# Patient Record
Sex: FEMALE | Race: WHITE | HISPANIC OR LATINO | Employment: FULL TIME | URBAN - METROPOLITAN AREA
[De-identification: names, ages, dates, MRNs, and addresses within clinical notes are randomized per-mention and may not be internally consistent; named-entity substitution may affect disease eponyms.]

---

## 2018-04-08 ENCOUNTER — HOSPITAL ENCOUNTER (EMERGENCY)
Facility: HOSPITAL | Age: 28
Discharge: HOME | End: 2018-04-08
Attending: EMERGENCY MEDICINE | Admitting: EMERGENCY MEDICINE
Payer: COMMERCIAL

## 2018-04-08 VITALS
RESPIRATION RATE: 18 BRPM | SYSTOLIC BLOOD PRESSURE: 118 MMHG | OXYGEN SATURATION: 100 % | DIASTOLIC BLOOD PRESSURE: 79 MMHG | TEMPERATURE: 98.4 F | HEART RATE: 80 BPM | WEIGHT: 188.49 LBS

## 2018-04-08 DIAGNOSIS — R42 DIZZINESS: ICD-10-CM

## 2018-04-08 DIAGNOSIS — R07.9 CHEST PAIN: Primary | ICD-10-CM

## 2018-04-08 LAB
ANION GAP SERPL CALCULATED.3IONS-SCNC: 11 MMOL/L (ref 4–13)
APTT PPP: 28 SECONDS (ref 23–35)
BASOPHILS # BLD AUTO: 0.03 THOUSANDS/ΜL (ref 0–0.1)
BASOPHILS NFR BLD AUTO: 1 % (ref 0–1)
BUN SERPL-MCNC: 13 MG/DL (ref 5–25)
CALCIUM SERPL-MCNC: 8.9 MG/DL (ref 8.3–10.1)
CHLORIDE SERPL-SCNC: 104 MMOL/L (ref 100–108)
CO2 SERPL-SCNC: 23 MMOL/L (ref 21–32)
CREAT SERPL-MCNC: 0.7 MG/DL (ref 0.6–1.3)
EOSINOPHIL # BLD AUTO: 0.13 THOUSAND/ΜL (ref 0–0.61)
EOSINOPHIL NFR BLD AUTO: 3 % (ref 0–6)
ERYTHROCYTE [DISTWIDTH] IN BLOOD BY AUTOMATED COUNT: 14.1 % (ref 11.6–15.1)
GFR SERPL CREATININE-BSD FRML MDRD: 119 ML/MIN/1.73SQ M
GLUCOSE SERPL-MCNC: 84 MG/DL (ref 65–140)
HCT VFR BLD AUTO: 38.2 % (ref 34.8–46.1)
HGB BLD-MCNC: 12.4 G/DL (ref 11.5–15.4)
INR PPP: 0.92 (ref 0.86–1.16)
LYMPHOCYTES # BLD AUTO: 1.34 THOUSANDS/ΜL (ref 0.6–4.47)
LYMPHOCYTES NFR BLD AUTO: 32 % (ref 14–44)
MCH RBC QN AUTO: 28.6 PG (ref 26.8–34.3)
MCHC RBC AUTO-ENTMCNC: 32.5 G/DL (ref 31.4–37.4)
MCV RBC AUTO: 88 FL (ref 82–98)
MONOCYTES # BLD AUTO: 0.37 THOUSAND/ΜL (ref 0.17–1.22)
MONOCYTES NFR BLD AUTO: 9 % (ref 4–12)
NEUTROPHILS # BLD AUTO: 2.33 THOUSANDS/ΜL (ref 1.85–7.62)
NEUTS SEG NFR BLD AUTO: 55 % (ref 43–75)
PLATELET # BLD AUTO: 336 THOUSANDS/UL (ref 149–390)
PMV BLD AUTO: 10.4 FL (ref 8.9–12.7)
POTASSIUM SERPL-SCNC: 3.5 MMOL/L (ref 3.5–5.3)
PROTHROMBIN TIME: 12.6 SECONDS (ref 12.1–14.4)
RBC # BLD AUTO: 4.34 MILLION/UL (ref 3.81–5.12)
SODIUM SERPL-SCNC: 138 MMOL/L (ref 136–145)
TROPONIN I SERPL-MCNC: <0.02 NG/ML
WBC # BLD AUTO: 4.2 THOUSAND/UL (ref 4.31–10.16)

## 2018-04-08 PROCEDURE — 93005 ELECTROCARDIOGRAM TRACING: CPT

## 2018-04-08 PROCEDURE — 84484 ASSAY OF TROPONIN QUANT: CPT | Performed by: EMERGENCY MEDICINE

## 2018-04-08 PROCEDURE — 85730 THROMBOPLASTIN TIME PARTIAL: CPT | Performed by: EMERGENCY MEDICINE

## 2018-04-08 PROCEDURE — 85610 PROTHROMBIN TIME: CPT | Performed by: EMERGENCY MEDICINE

## 2018-04-08 PROCEDURE — 96360 HYDRATION IV INFUSION INIT: CPT

## 2018-04-08 PROCEDURE — 36415 COLL VENOUS BLD VENIPUNCTURE: CPT | Performed by: EMERGENCY MEDICINE

## 2018-04-08 PROCEDURE — 85025 COMPLETE CBC W/AUTO DIFF WBC: CPT | Performed by: EMERGENCY MEDICINE

## 2018-04-08 PROCEDURE — 80048 BASIC METABOLIC PNL TOTAL CA: CPT | Performed by: EMERGENCY MEDICINE

## 2018-04-08 PROCEDURE — 99284 EMERGENCY DEPT VISIT MOD MDM: CPT

## 2018-04-08 RX ORDER — MECLIZINE HCL 12.5 MG/1
25 TABLET ORAL ONCE
Status: COMPLETED | OUTPATIENT
Start: 2018-04-08 | End: 2018-04-08

## 2018-04-08 RX ORDER — MECLIZINE HYDROCHLORIDE 25 MG/1
25 TABLET ORAL 3 TIMES DAILY PRN
Qty: 30 TABLET | Refills: 0 | Status: SHIPPED | OUTPATIENT
Start: 2018-04-08

## 2018-04-08 RX ADMIN — SODIUM CHLORIDE 1000 ML: 0.9 INJECTION, SOLUTION INTRAVENOUS at 09:42

## 2018-04-08 RX ADMIN — MECLIZINE 25 MG: 12.5 TABLET ORAL at 09:54

## 2018-04-08 NOTE — DISCHARGE INSTRUCTIONS
Mareos, cuidados ambulatorios   INFORMACIÓN GENERAL:   El mareo  es un término que se [place] para describir shine sensación de desequilibrio o inestabilidad  Las causas comunes del mareo son un desequilibrio del líquido del oído interno o la falta de oxígeno en chiu maile  Chiu mareo puede ser kyrie (dura 3 días o menos) o crónico (dura más de 3 días)  Usted puede llegar a tener episodios de [employer] que myers de segundos a unas horas  Síntomas comunes relacionados a los mareos [facility] siguientes:   · Shine sensación de que los alrededores se están moviendo aún cuando usted está quieto     · Tintineo en los oídos o pérdida del oído     · Sensación de mareo o desvanecimiento    · Debilidad o inestabilidad     · Visión doble o movimientos oculares que usted no puede controlar     · Náusea o vómito     · Confusión  Busque cuidados inmediatos para los siguientes síntomas:   · Usted tiene dolor de [place] y el dolor no se le lev con medicamento  · Usted tiene escalofríos cee y [place]  · Usted vomita shine y [place] vez sin [place]  · Chiu vómito o heces están rojos o negros  · Usted tiene dolor en el pecho, espalda o abdomen  · Usted tiene adormecimiento, sobre todo en la sean, brazos, o piernas  · Usted tiene dificultad para  los brazos o piernas  El tratamiento para los mareos  depende de la causa de wali [place]  Es probable que usted necesite medicamentos para disminuir wali mareos o náusea  También es probable que usted necesite que lo internen en el hospital para recibir tratamiento  Maneje wali síntomas:  Levántese despacio después de ruth estado sentado o acostado  No maneje ni opere maquinaria cuando esté mareado  Programe shine gracia de seguimiento con chiu proveedor de rosa isela según indicaciones:  Anote wali preguntas para que las recuerde kennedy wali citas de seguimiento  ACUERDOS SOBRE CHIU CUIDADO:   Usted tiene el derecho de participar en la planificación de chiu cuidado   Aprenda todo lo que pueda sobre chiu condición y alphonse darle [place]  Discuta con wali médicos wali opciones de tratamiento para juntos decidir el cuidado que usted quiere recibir  Usted siempre tiene el derecho a rechazar chiu tratamiento  Esta información es sólo para uso en educación  Chiu intención no es darle un consejo médico sobre enfermedades o tratamientos  Colsulte con chiu Bary Alonso farmacéutico antes de seguir cualquier régimen médico para saber si es seguro y efectivo para usted  © 2014 [address] is for End User's use only and may not be sold, redistributed or otherwise used for commercial purposes  All illustrations and images included in CareNotes® are the copyrighted property of A D A M , Inc  or Kishor Elena  Dolor de pecho   LO QUE NECESITA SABER:   ¿Qué provoca el dolor en el pecho? El dolor en el pecho puede ser provocado por shine variedad de condiciones, algunas no tan serias y otras que son de peligro mortal  El dolor de pecho también puede ser un síntoma de un problema digestivo, alphonse la acidez o shine úlcera estomacal  Un ataque de ansiedad o shine emoción yeimi, alphonse el enojo, también pueden provocar dolor de [place]  Shine infección, inflamación o fractura en un hueso o cartílago en el pecho podría provocar dolor o molestia  En ocasiones el dolor torácico o la presión en el pecho pueden ser el resultado de edda circulación de la maile al corazón (angina)  El dolor de pecho también puede ser causado por trastornos potencialmente mortales alphonse un ataque al corazón o un coágulo de [employer]  ¿Cuáles otros síntomas podrían presentarse con el dolor en el pecho?    · Shine sensación de ardor detrás del esternón    · Ritmo cardíaco acelerado o lento     · Fiebre o sudoración     · Náuseas o vómito     · Dificultad para respirar     · [employer] o presión que se propaga del pecho a la espalda, Tran o brazo     · Sensación de debilidad, cansancio o desmayo  ¿Cómo se diagnostica la causa del dolor en el pecho? Chiu médico lo examinará  Describa chiu dolor en el pecho con el celia detalle que sea posible  Dígale dónde le duele y cuándo empezó el dolor  Coméntele si usted nota algo que hace empeorar o mejorar el dolor  Además infórmele si el dolor es carli o intermitente  Chiu médico le preguntará cuáles son los [place] anika y acerca de cualquier condición médica que tenga  También lo examinará  Es posible que también deba hacerse alguno de los siguientes exámenes:  · Un electrocardiograma  es un examen que registra la actividad eléctrica de chiu corazón  · Los análisis de maile:  revisan si hay daños en el corazón y signos de ataque cardíaco      · Un ecocardiograma  [place] ondas de laz para eloisa si la maile fluye normalmente a través del corazón  · Un ultrasonido, shine radiografía, shine tomografía computarizada o shine imagen por resonancia magnética (IRM)  podría mostrar la causa de chiu dolor de pecho  Es posible que le administren líquido de contraste para que chiu corazón se clement mejor en las imágenes  Dígale al médico si usted alguna vez ha tenido shine reacción alérgica al líquido de [place]  No entre a la eliezer donde se realiza la resonancia magnética con algo de metal  El metal puede causar lesiones serias  Dígale al médico si usted tiene algo de metal por dentro o sobre chiu cuerpo  · Shine endoscopia  puede hacerse para revisar si tiene úlceras o problemas con el esófago  ¿Cómo se trata el dolor en el pecho? Se le podrán administrar medicamentos para tratar la causa del dolor de pecho  Por ejemplo, analgésicos, medicamentos para la ansiedad o medicamentos para aumentar el flujo de maile al corazón  No  tome ciertos medicamentos sin antes preguntarle a chiu médico  Estos incluyen TASNEEM, suplementos vitamínicos o a base de hierbas u hormonas (estrógeno o progestágeno)  ¿Cuáles son Sarah Oseguera consejos para vivir shine kyle bonnie?   Los siguientes son consejos generales de rosa isela  Si chiu dolor de pecho es causado por un problema cardíaco, chiu médico le dará consejos específicos a seguir  · No fume  La nicotina y otros químicos contenidos en los cigarrillos y cigarros pueden causar daño a bertha pulmones y el corazón  Pida información a chiu médico si usted actualmente fuma y necesita ayuda para dejar de fumar  Los cigarrillos electrónicos o tabaco sin humo todavía contienen nicotina  Consulte con chiu médico antes de [employer]  · Consuma shine variedad de alimentos saludables y bajos en grasas  Los alimentos saludables incluyen frutas, verduras, pan integral, productos lácteos bajos en grasa, frijoles, maik magras y pescado  Pida más información acerca de shine dieta saludable para el corazón  · Pregunte acerca de la [place]  Chiu médico le dirá cuáles actividades limitar y cuáles evitar  Pregunte cuándo [employer], regresar a chiu trabajo y [employer]  Pida más información acerca de un plan de ejercicio adecuado para usted  · Mantenga un peso saludable  Consulte con chiu médico cuánto debería pesar  Solicite que lo ayude a crear un plan para bajar de peso si tiene sobrepeso  Llame al 911 si presenta:   · Usted tiene alguno de los siguientes signos de un ataque cardíaco:      ¨ Estornudos, presión, o dolor en chiu pecho que dura mas de 5 minutos o regresa  ¨ Malestar o dolor en chiu espalda, anant, mandíbula, abdomen, o brazo     ¨ Dificultad para respirar    ¨ Náuseas o vómito    ¨ Siente un desvanecimiento o tiene sudores fríos especialmente en el pecho o dificultad para respirar  ¿Cuándo beltran buscar atención inmediata? · La inflamación en chiu pecho empeora, aun con tratamiento  · Usted tose o vomita maile  · Bertha heces son negras o tienen maile  · Usted no puede dejar de vomitar o le duele al tragar    ¿Cuándo beltran comunicarme con mi médico?   · Usted tiene preguntas o inquietudes acerca de chiu condición o cuidado  ACUERDOS SOBRE GREEN CUIDADO:   Usted tiene el derecho de ayudar a planear green cuidado  Aprenda todo lo que pueda sobre green condición y alphonse darle tratamiento  Discuta wali opciones de tratamiento con wali médicos para decidir el cuidado que usted desea recibir  Usted siempre tiene el derecho de rechazar el tratamiento  Esta información es sólo para uso en educación  Green intención no es darle un consejo médico sobre enfermedades o tratamientos  Colsulte con green Suzi Burris farmacéutico antes de seguir cualquier régimen médico para saber si es seguro y efectivo para usted  © 2017 [address] Information is for End User's use only and may not be sold, redistributed or otherwise used for commercial purposes  All illustrations and images included in CareNotes® are the copyrighted property of A D A M , Inc  or Kishor Elena

## 2018-04-08 NOTE — ED PROVIDER NOTES
History  Chief Complaint   Patient presents with    Dizziness     pt was working at [employer] when she got a sharp sudden pain in chest, became dizzy  felt tlike she couldnt stand, not feels like her legs feels numb and hands cant open all the way and feels short of breath     Patient presents to the emergency department for evaluation of chest pain dizziness sob and bilateral hand and extremity numbness and tingling  This occurred while patient was at work  She denies any trauma fall or injury  She feels improved currently  She denies medical history and is not on medication  She denies trauma fall or injury  Denies fever chills  Denies visual complaints  Denies head injury or neck pain  Denies rash  Patient denies nausea vomiting diarrhea  Denies ill contacts  Denies leg pain or calf pain  None       History reviewed  No pertinent past medical history  History reviewed  No pertinent surgical history  History reviewed  No pertinent family history  I have reviewed and agree with the history as documented  Social History   Substance Use Topics    Smoking status: Never Smoker    Smokeless tobacco: Never Used    Alcohol use No        Review of Systems   Constitutional: Negative  Negative for activity change, appetite change, chills, diaphoresis, fatigue and fever  HENT: Negative  Negative for congestion, drooling, rhinorrhea, sinus pain, sinus pressure, sore throat and trouble swallowing  Eyes: Negative  Negative for photophobia and visual disturbance  Respiratory: Positive for shortness of breath  Negative for cough, chest tightness, wheezing and stridor  Cardiovascular: Positive for chest pain  Negative for palpitations and leg swelling  Gastrointestinal: Negative  Negative for abdominal distention, abdominal pain, anal bleeding, blood in stool, constipation, diarrhea, nausea, rectal pain and vomiting  Endocrine: Negative  Genitourinary: Negative    Negative for difficulty urinating, dysuria, flank pain, frequency, genital sores, hematuria, pelvic pain, urgency, vaginal bleeding, vaginal discharge and vaginal pain  Musculoskeletal: Negative  Negative for back pain and neck pain  Skin: Negative  Negative for rash and wound  Allergic/Immunologic: Negative  Neurological: Positive for dizziness, light-headedness and numbness  Negative for tremors, seizures, syncope, facial asymmetry, speech difficulty, weakness and headaches  Hematological: Negative  Psychiatric/Behavioral: Negative  Physical Exam  ED Triage Vitals [04/08/18 0848]   Temperature Pulse Respirations Blood Pressure SpO2   98 4 °F (36 9 °C) 76 18 122/84 98 %      Temp Source Heart Rate Source Patient Position - Orthostatic VS BP Location FiO2 (%)   Oral Monitor Sitting Right arm --      Pain Score       Worst Possible Pain           Orthostatic Vital Signs  Vitals:    04/08/18 0848 04/08/18 0900   BP: 122/84 118/79   Pulse: 76 80   Patient Position - Orthostatic VS: Sitting        Physical Exam   Constitutional: She is oriented to person, place, and time  She appears well-developed and well-nourished  Nontoxic appearance without respiratory distress  Patient makes poor eye contact and her eyes were closed during most of the history taking session  Patient does not appear to be in respiratory distress and does not appear to be uncomfortable sitting upright on the stretcher  HENT:   Head: Normocephalic and atraumatic  Right Ear: External ear normal    Left Ear: External ear normal    Mouth/Throat: Oropharynx is clear and moist    Eyes: Conjunctivae and EOM are normal  Pupils are equal, round, and reactive to light  Neck: Normal range of motion  Neck supple  Cardiovascular: Normal rate, regular rhythm, normal heart sounds and intact distal pulses  Pulmonary/Chest: Effort normal and breath sounds normal  No respiratory distress  She has no wheezes  She has no rales   She exhibits no tenderness  Abdominal: Soft  Bowel sounds are normal  She exhibits no distension and no mass  There is no tenderness  There is no rebound and no guarding  No hernia  Musculoskeletal: Normal range of motion  She exhibits no edema, tenderness or deformity  Neurological: She is alert and oriented to person, place, and time  She has normal reflexes  She displays normal reflexes  No cranial nerve deficit or sensory deficit  She exhibits normal muscle tone  Coordination normal    Skin: Skin is warm and dry  No rash noted  No erythema  No pallor  Psychiatric: She has a normal mood and affect  Her behavior is normal  Judgment normal    Nursing note and vitals reviewed  ED Medications  Medications   sodium chloride 0 9 % bolus 1,000 mL (1,000 mL Intravenous New Bag 4/8/18 0942)   meclizine (ANTIVERT) tablet 25 mg (25 mg Oral Given 4/8/18 0954)       Diagnostic Studies  Results Reviewed     Procedure Component Value Units Date/Time    Troponin I [[id number]]  (Normal) Collected:  04/08/18 0946    Lab Status:  Final result Specimen:  Blood from Arm, Right Updated:  04/08/18 1012     Troponin I <0 02 ng/mL     Narrative:         [employer] Chemistry analyzer 99% cutoff is > 0 04 ng/mL in network labs    o cTnI 99% cutoff is useful only when applied to patients in the clinical setting of myocardial ischemia  o cTnI 99% cutoff should be interpreted in the context of clinical history, ECG findings and possibly cardiac imaging to establish correct diagnosis  o cTnI 99% cutoff may be suggestive but clearly not indicative of a coronary event without the clinical setting of myocardial ischemia      Basic metabolic panel [[id number]] Collected:  04/08/18 0946    Lab Status:  Final result Specimen:  Blood from Arm, Right Updated:  04/08/18 1004     Sodium 138 mmol/L      Potassium 3 5 mmol/L      Chloride 104 mmol/L      CO2 23 mmol/L      Anion Gap 11 mmol/L      BUN 13 mg/dL      Creatinine 0 70 mg/dL      Glucose 84 mg/dL Calcium 8 9 mg/dL      eGFR 119 ml/min/1 73sq m     Narrative:         National Kidney Disease Education Program recommendations are as follows:  GFR calculation is accurate only with a steady state creatinine  Chronic Kidney disease less than 60 ml/min/1 73 sq  meters  Kidney failure less than 15 ml/min/1 73 sq  meters  Moo Hernandez [[id number]]  (Normal) Collected:  04/08/18 0946    Lab Status:  Final result Specimen:  Blood from Arm, Right Updated:  04/08/18 1004     Protime 12 6 seconds      INR 0 92    APTT [[id number]]  (Normal) Collected:  04/08/18 0946    Lab Status:  Final result Specimen:  Blood from Arm, Right Updated:  04/08/18 1004     PTT 28 seconds     Narrative:          Therapeutic Heparin Range = 60-90 seconds    CBC and differential [[id number]]  (Abnormal) Collected:  04/08/18 0946    Lab Status:  Final result Specimen:  Blood from Arm, Right Updated:  04/08/18 0953     WBC 4 20 (L) Thousand/uL      RBC 4 34 Million/uL      Hemoglobin 12 4 g/dL      Hematocrit 38 2 %      MCV 88 fL      MCH 28 6 pg      MCHC 32 5 g/dL      RDW 14 1 %      MPV 10 4 fL      Platelets 731 Thousands/uL      Neutrophils Relative 55 %      Lymphocytes Relative 32 %      Monocytes Relative 9 %      Eosinophils Relative 3 %      Basophils Relative 1 %      Neutrophils Absolute 2 33 Thousands/µL      Lymphocytes Absolute 1 34 Thousands/µL      Monocytes Absolute 0 37 Thousand/µL      Eosinophils Absolute 0 13 Thousand/µL      Basophils Absolute 0 03 Thousands/µL                  No orders to display              Procedures  ECG 12 Lead Documentation  Date/Time: 4/8/2018 9:24 AM  Performed by: Radha Rivera  Authorized by: Radha Rivera     ECG reviewed by me, the ED Provider: yes    Patient location:  ED  Previous ECG:     Previous ECG:  Unavailable  Interpretation:     Interpretation: normal    Rate:     ECG rate:  74    ECG rate assessment: normal    Rhythm:     Rhythm: sinus rhythm    Ectopy:     Ectopy: none    QRS: QRS axis:  Normal    QRS intervals:  Normal  Conduction:     Conduction: normal    ST segments:     ST segments:  Normal  T waves:     T waves: normal             Phone Contacts  ED Phone Contact    ED Course  ED Course as of Apr 08 1031   Sun Apr 08, 2018   1016 Patient is stable for discharge  Discharge with meclizine  ED workup unremarkable including troponin other labs and EKG  Vital signs stable  [facility]  CritCare Time    Disposition  Final diagnoses:   Chest pain   Dizziness     Time reflects when diagnosis was documented in both MDM as applicable and the Disposition within this note     Time User Action Codes Description Comment    4/8/2018 10:17 AM Ankita Norris Add [R07 9] Chest pain     4/8/2018 10:17 AM Margie Dickinson Add [R42] Dizziness       ED Disposition     ED Disposition Condition Comment    Discharge  [facility] discharge to home/self care  Condition at discharge: Stable        Follow-up Information     Follow up With Specialties Details Why [address] [place] [address] physician  Schedule an appointment as soon as possible for a visit          Patient's Medications   Discharge Prescriptions    MECLIZINE (ANTIVERT) 25 MG TABLET    Take 1 tablet (25 mg total) by mouth 3 (three) times a day as needed for dizziness       Start Date: 4/8/2018  End Date: --       Order Dose: 25 mg       Quantity: 30 tablet    Refills: 0     No discharge procedures on file      ED Provider  Electronically Signed by           Portillo Mack MD  04/08/18 372 Eusebio Harrison MD  04/08/18 0126

## 2018-04-09 LAB
ATRIAL RATE: 74 BPM
P AXIS: 39 DEGREES
PR INTERVAL: 144 MS
QRS AXIS: 59 DEGREES
QRSD INTERVAL: 82 MS
QT INTERVAL: 362 MS
QTC INTERVAL: 401 MS
T WAVE AXIS: 41 DEGREES
VENTRICULAR RATE: 74 BPM

## 2022-02-08 ENCOUNTER — APPOINTMENT (EMERGENCY)
Dept: CT IMAGING | Facility: HOSPITAL | Age: 32
End: 2022-02-08
Payer: COMMERCIAL

## 2022-02-08 ENCOUNTER — HOSPITAL ENCOUNTER (EMERGENCY)
Facility: HOSPITAL | Age: 32
Discharge: HOME | End: 2022-02-08
Attending: EMERGENCY MEDICINE | Admitting: EMERGENCY MEDICINE
Payer: COMMERCIAL

## 2022-02-08 ENCOUNTER — APPOINTMENT (EMERGENCY)
Dept: RADIOLOGY | Facility: HOSPITAL | Age: 32
End: 2022-02-08
Payer: COMMERCIAL

## 2022-02-08 VITALS
OXYGEN SATURATION: 100 % | SYSTOLIC BLOOD PRESSURE: 120 MMHG | WEIGHT: 177.69 LBS | HEIGHT: 67 IN | HEART RATE: 66 BPM | TEMPERATURE: 98 F | RESPIRATION RATE: 20 BRPM | BODY MASS INDEX: 27.89 KG/M2 | DIASTOLIC BLOOD PRESSURE: 80 MMHG

## 2022-02-08 DIAGNOSIS — R07.89 ATYPICAL CHEST PAIN: Primary | ICD-10-CM

## 2022-02-08 DIAGNOSIS — K29.00 ACUTE GASTRITIS WITHOUT HEMORRHAGE, UNSPECIFIED GASTRITIS TYPE: ICD-10-CM

## 2022-02-08 LAB
ALBUMIN SERPL BCP-MCNC: 3.7 G/DL (ref 3.5–5)
ALP SERPL-CCNC: 68 U/L (ref 46–116)
ALT SERPL W P-5'-P-CCNC: 25 U/L (ref 12–78)
ANION GAP SERPL CALCULATED.3IONS-SCNC: 5 MMOL/L (ref 4–13)
AST SERPL W P-5'-P-CCNC: 13 U/L (ref 5–45)
ATRIAL RATE: 66 BPM
BASOPHILS # BLD AUTO: 0.02 THOUSANDS/ΜL (ref 0–0.1)
BASOPHILS NFR BLD AUTO: 0 % (ref 0–1)
BILIRUB SERPL-MCNC: 0.31 MG/DL (ref 0.2–1)
BILIRUB UR QL STRIP: NEGATIVE
BUN SERPL-MCNC: 8 MG/DL (ref 5–25)
CALCIUM SERPL-MCNC: 8.6 MG/DL (ref 8.3–10.1)
CARDIAC TROPONIN I PNL SERPL HS: <2 NG/L
CARDIAC TROPONIN I PNL SERPL HS: <2 NG/L
CHLORIDE SERPL-SCNC: 102 MMOL/L (ref 100–108)
CLARITY UR: CLEAR
CO2 SERPL-SCNC: 27 MMOL/L (ref 21–32)
COLOR UR: YELLOW
CREAT SERPL-MCNC: 0.68 MG/DL (ref 0.6–1.3)
EOSINOPHIL # BLD AUTO: 0.11 THOUSAND/ΜL (ref 0–0.61)
EOSINOPHIL NFR BLD AUTO: 2 % (ref 0–6)
ERYTHROCYTE [DISTWIDTH] IN BLOOD BY AUTOMATED COUNT: 12.6 % (ref 11.6–15.1)
EXT PREG TEST URINE: NEGATIVE
EXT. CONTROL ED NAV: NORMAL
GFR SERPL CREATININE-BSD FRML MDRD: 116 ML/MIN/1.73SQ M
GLUCOSE SERPL-MCNC: 102 MG/DL (ref 65–140)
GLUCOSE UR STRIP-MCNC: NEGATIVE MG/DL
HCT VFR BLD AUTO: 41.2 % (ref 34.8–46.1)
HGB BLD-MCNC: 13.6 G/DL (ref 11.5–15.4)
HGB UR QL STRIP.AUTO: NEGATIVE
IMM GRANULOCYTES # BLD AUTO: 0.02 THOUSAND/UL (ref 0–0.2)
IMM GRANULOCYTES NFR BLD AUTO: 0 % (ref 0–2)
KETONES UR STRIP-MCNC: NEGATIVE MG/DL
LEUKOCYTE ESTERASE UR QL STRIP: NEGATIVE
LIPASE SERPL-CCNC: 34 U/L (ref 73–393)
LYMPHOCYTES # BLD AUTO: 1.1 THOUSANDS/ΜL (ref 0.6–4.47)
LYMPHOCYTES NFR BLD AUTO: 24 % (ref 14–44)
MCH RBC QN AUTO: 30.1 PG (ref 26.8–34.3)
MCHC RBC AUTO-ENTMCNC: 33 G/DL (ref 31.4–37.4)
MCV RBC AUTO: 91 FL (ref 82–98)
MONOCYTES # BLD AUTO: 0.51 THOUSAND/ΜL (ref 0.17–1.22)
MONOCYTES NFR BLD AUTO: 11 % (ref 4–12)
NEUTROPHILS # BLD AUTO: 2.76 THOUSANDS/ΜL (ref 1.85–7.62)
NEUTS SEG NFR BLD AUTO: 63 % (ref 43–75)
NITRITE UR QL STRIP: NEGATIVE
NRBC BLD AUTO-RTO: 0 /100 WBCS
P AXIS: 60 DEGREES
PH UR STRIP.AUTO: 6 [PH] (ref 4.5–8)
PLATELET # BLD AUTO: 327 THOUSANDS/UL (ref 149–390)
PMV BLD AUTO: 10.4 FL (ref 8.9–12.7)
POTASSIUM SERPL-SCNC: 3.7 MMOL/L (ref 3.5–5.3)
PR INTERVAL: 140 MS
PROT SERPL-MCNC: 7.6 G/DL (ref 6.4–8.2)
PROT UR STRIP-MCNC: NEGATIVE MG/DL
QRS AXIS: 91 DEGREES
QRSD INTERVAL: 76 MS
QT INTERVAL: 368 MS
QTC INTERVAL: 385 MS
RBC # BLD AUTO: 4.52 MILLION/UL (ref 3.81–5.12)
SODIUM SERPL-SCNC: 134 MMOL/L (ref 136–145)
SP GR UR STRIP.AUTO: 1.02 (ref 1–1.03)
T WAVE AXIS: 61 DEGREES
UROBILINOGEN UR QL STRIP.AUTO: 0.2 E.U./DL
VENTRICULAR RATE: 66 BPM
WBC # BLD AUTO: 4.52 THOUSAND/UL (ref 4.31–10.16)

## 2022-02-08 PROCEDURE — 99285 EMERGENCY DEPT VISIT HI MDM: CPT

## 2022-02-08 PROCEDURE — 80053 COMPREHEN METABOLIC PANEL: CPT | Performed by: EMERGENCY MEDICINE

## 2022-02-08 PROCEDURE — 71045 X-RAY EXAM CHEST 1 VIEW: CPT

## 2022-02-08 PROCEDURE — G1004 CDSM NDSC: HCPCS

## 2022-02-08 PROCEDURE — 93010 ELECTROCARDIOGRAM REPORT: CPT | Performed by: INTERNAL MEDICINE

## 2022-02-08 PROCEDURE — 36415 COLL VENOUS BLD VENIPUNCTURE: CPT

## 2022-02-08 PROCEDURE — 83690 ASSAY OF LIPASE: CPT

## 2022-02-08 PROCEDURE — 81003 URINALYSIS AUTO W/O SCOPE: CPT

## 2022-02-08 PROCEDURE — 81025 URINE PREGNANCY TEST: CPT

## 2022-02-08 PROCEDURE — 93005 ELECTROCARDIOGRAM TRACING: CPT

## 2022-02-08 PROCEDURE — 85025 COMPLETE CBC W/AUTO DIFF WBC: CPT | Performed by: EMERGENCY MEDICINE

## 2022-02-08 PROCEDURE — 84484 ASSAY OF TROPONIN QUANT: CPT | Performed by: EMERGENCY MEDICINE

## 2022-02-08 PROCEDURE — 99285 EMERGENCY DEPT VISIT HI MDM: CPT | Performed by: EMERGENCY MEDICINE

## 2022-02-08 PROCEDURE — 74177 CT ABD & PELVIS W/CONTRAST: CPT

## 2022-02-08 RX ORDER — MAGNESIUM HYDROXIDE/ALUMINUM HYDROXICE/SIMETHICONE 120; 1200; 1200 MG/30ML; MG/30ML; MG/30ML
30 SUSPENSION ORAL ONCE
Status: COMPLETED | OUTPATIENT
Start: 2022-02-08 | End: 2022-02-08

## 2022-02-08 RX ORDER — LIDOCAINE HYDROCHLORIDE 20 MG/ML
15 SOLUTION OROPHARYNGEAL ONCE
Status: COMPLETED | OUTPATIENT
Start: 2022-02-08 | End: 2022-02-08

## 2022-02-08 RX ORDER — FAMOTIDINE 20 MG/1
20 TABLET, FILM COATED ORAL ONCE
Status: COMPLETED | OUTPATIENT
Start: 2022-02-08 | End: 2022-02-08

## 2022-02-08 RX ADMIN — FAMOTIDINE 20 MG: 20 TABLET, FILM COATED ORAL at 08:27

## 2022-02-08 RX ADMIN — LIDOCAINE HYDROCHLORIDE 15 ML: 20 SOLUTION ORAL; TOPICAL at 08:27

## 2022-02-08 RX ADMIN — ALUMINA, MAGNESIA, AND SIMETHICONE ORAL SUSPENSION REGULAR STRENGTH 30 ML: 1200; 1200; 120 SUSPENSION ORAL at 09:08

## 2022-02-08 RX ADMIN — IODIXANOL 100 ML: 320 INJECTION, SOLUTION INTRAVASCULAR at 08:44

## 2022-02-08 NOTE — ED PROVIDER NOTES
History  Chief Complaint   Patient presents with    Chest Pain     Patient stated that she woke up with chest pain that radiates into her jaw and left arm  [facility] after experiencing sudden onset of chest pain and left arm pain that began the morning of February 8th with no inciting event  She states that she woke up from bed and felt this chest discomfort and left arm pain, expressed feeling nauseous and attempted to vomit 1 time this morning that was only liquid/bilious/food contents  Describes that she has been having ongoing nausea/abdominal discomfort for the past 3 days  She has not taken any medications for these symptoms  She also describes having transitory joint pain following episodes of emesis had resolved after approximately 15-20 minutes  She describes that she has no medical conditions that she is followed by medical professionals for  Denies any dizziness, lightheadedness, changes in vision, changes in hearing, difficulty catching her breath, skin changes, loss of consciousness, fever, chills, changes in urination, changes in bowel movements  She states that she did not lose consciousness today and did not strike her head  She denies any prodromal symptoms of feeling dizzy, lightheaded, or increased diaphoresis  At time of interview in the emergency department, patient stated that she was still having persistent left chest wall pain and left arm pain  However, the severity of the symptoms have dramatically decreased since the onset this morning approximately 3 hours prior        History provided by:  Patient   used: No    Chest Pain  Pain location:  L chest  Pain quality: stabbing and throbbing    Pain radiates to:  L arm  Pain radiates to the back: no    Pain severity:  Mild  Onset quality:  Sudden  Duration:  4 hours  Timing:  Constant  Progression:  Improving  Chronicity:  New  Context: not breathing, no drug use, not eating, no intercourse, not lifting, no movement, not raising an arm, not at rest, no stress and no trauma    Relieved by:  Nothing  Worsened by:  Nothing tried  Ineffective treatments:  None tried  Associated symptoms: abdominal pain    Associated symptoms: no AICD problem, no altered mental status, no anorexia, no anxiety, no back pain, no claudication, no cough, no diaphoresis, no dizziness, no dysphagia, no fatigue, no fever, no headache, no heartburn, no lower extremity edema, no nausea, no near-syncope, no numbness, no orthopnea, no palpitations, no PND, no shortness of breath, no syncope, not vomiting and no weakness        Prior to Admission Medications   Prescriptions Last Dose Informant Patient Reported? Taking?   meclizine (ANTIVERT) 25 mg tablet   No No   Sig: Take 1 tablet (25 mg total) by mouth 3 (three) times a day as needed for dizziness      Facility-Administered Medications: None       History reviewed  No pertinent past medical history  History reviewed  No pertinent surgical history  History reviewed  No pertinent family history  I have reviewed and agree with the history as documented  E-Cigarette/Vaping     E-Cigarette/Vaping Substances     Social History     Tobacco Use    Smoking status: Never Smoker    Smokeless tobacco: Never Used   Substance Use Topics    Alcohol use: No    Drug use: No        Review of Systems   Constitutional: Negative for chills, diaphoresis, fatigue and fever  HENT: Negative for ear pain, sore throat and trouble swallowing  Eyes: Negative for pain and visual disturbance  Respiratory: Negative for cough and shortness of breath  Cardiovascular: Positive for chest pain  Negative for palpitations, orthopnea, claudication, syncope, PND and near-syncope  Gastrointestinal: Positive for abdominal pain  Negative for anorexia, heartburn, nausea and vomiting  Genitourinary: Negative for dysuria and hematuria     Musculoskeletal: Negative for arthralgias and back pain    Skin: Negative for color change and rash  Neurological: Negative for dizziness, seizures, syncope, weakness, numbness and headaches  All other systems reviewed and are negative  Physical Exam  ED Triage Vitals [02/08/22 0519]   Temperature Pulse Respirations Blood Pressure SpO2   98 °F (36 7 °C) 67 16 122/76 100 %      Temp Source Heart Rate Source Patient Position - Orthostatic VS BP Location FiO2 (%)   Oral Monitor Lying Right arm --      Pain Score       8             Orthostatic Vital Signs  Vitals:    02/08/22 0519 02/08/22 0900   BP: 122/76 120/80   Pulse: 67 66   Patient Position - Orthostatic VS: Lying        Physical Exam  Vitals and nursing note reviewed  Constitutional:       Appearance: She is well-developed  HENT:      Head: Normocephalic and atraumatic  Eyes:      Extraocular Movements: Extraocular movements intact  Conjunctiva/sclera: Conjunctivae normal       Pupils: Pupils are equal, round, and reactive to light  Cardiovascular:      Rate and Rhythm: Normal rate and regular rhythm  Heart sounds: Normal heart sounds  No murmur heard  No S3 or S4 sounds  Pulmonary:      Effort: Pulmonary effort is normal  No respiratory distress  Breath sounds: Normal breath sounds  Abdominal:      Palpations: Abdomen is soft  Tenderness: There is abdominal tenderness  There is no guarding or rebound  Comments: Tenderness present of right lower, suprapubic, and left lower quadrant  Musculoskeletal:      Cervical back: Normal range of motion and neck supple  Skin:     General: Skin is warm and dry  Capillary Refill: Capillary refill takes less than 2 seconds  Neurological:      General: No focal deficit present  Mental Status: She is alert and oriented to person, place, and time     Psychiatric:         Mood and Affect: Mood normal          ED Medications  Medications   Lidocaine Viscous HCl (XYLOCAINE) 2 % mucosal solution 15 mL (15 mL Swish & Swallow Given 2/8/22 0827)   famotidine (PEPCID) tablet 20 mg (20 mg Oral Given 2/8/22 0827)   aluminum-magnesium hydroxide-simethicone (MYLANTA) oral suspension 30 mL (30 mL Oral Given 2/8/22 0908)   iodixanol (VISIPAQUE) 320 MG/ML injection 100 mL (100 mL Intravenous Given 2/8/22 0844)       Diagnostic Studies  Results Reviewed     Procedure Component Value Units Date/Time    HS Troponin I 2hr [[id number]] Collected: 02/08/22 0810    Lab Status: Final result Specimen: Blood from Arm, Left Updated: 02/08/22 0844     hs TnI 2hr <2 ng/L      Delta 2hr hsTnI --    Lipase [[id number]]  (Abnormal) Collected: 02/08/22 0531    Lab Status: Final result Specimen: Blood from Arm, Left Updated: 02/08/22 0805     Lipase 34 u/L     POCT pregnancy, urine [[id number]]  (Normal) Resulted: 02/08/22 0800    Lab Status: Final result Updated: 02/08/22 0800     EXT PREG TEST UR (Ref: Negative) negative     Control valid    Urine Macroscopic, POC [[id number]] Collected: 02/08/22 0755    Lab Status: Final result Specimen: Urine Updated: 02/08/22 0757     Color, UA Yellow     Clarity, UA Clear     pH, UA 6 0     Leukocytes, UA Negative     Nitrite, UA Negative     Protein, UA Negative mg/dl      Glucose, UA Negative mg/dl      Ketones, UA Negative mg/dl      Urobilinogen, UA 0 2 E U /dl      Bilirubin, UA Negative     Blood, UA Negative     Specific Gravity, UA 1 020    Narrative:      CLINITEK RESULT    HS Troponin 0hr (reflex protocol) [[id number]]  (Normal) Collected: 02/08/22 0531    Lab Status: Final result Specimen: Blood from Arm, Left Updated: 02/08/22 0609     hs TnI 0hr <2 ng/L     Comprehensive metabolic panel [[id number]]  (Abnormal) Collected: 02/08/22 0531    Lab Status: Final result Specimen: Blood from Arm, Left Updated: 02/08/22 0556     Sodium 134 mmol/L      Potassium 3 7 mmol/L      Chloride 102 mmol/L      CO2 27 mmol/L      ANION GAP 5 mmol/L      BUN 8 mg/dL      Creatinine 0 68 mg/dL      Glucose 102 mg/dL      Calcium 8 6 mg/dL      AST 13 U/L      ALT 25 U/L      Alkaline Phosphatase 68 U/L      Total Protein 7 6 g/dL      Albumin 3 7 g/dL      Total Bilirubin 0 31 mg/dL      eGFR 116 ml/min/1 73sq m     Narrative:      [place] guidelines for Chronic Kidney Disease (CKD):     Stage 1 with normal or high GFR (GFR > 90 mL/min/1 73 square meters)    Stage 2 Mild CKD (GFR = 60-89 mL/min/1 73 square meters)    Stage 3A Moderate CKD (GFR = 45-59 mL/min/1 73 square meters)    Stage 3B Moderate CKD (GFR = 30-44 mL/min/1 73 square meters)    Stage 4 Severe CKD (GFR = 15-29 mL/min/1 73 square meters)    Stage 5 End Stage CKD (GFR <15 mL/min/1 73 square meters)  Note: GFR calculation is accurate only with a steady state creatinine    CBC and differential [[id number]] Collected: 02/08/22 0531    Lab Status: Final result Specimen: Blood from Arm, Left Updated: 02/08/22 0546     WBC 4 52 Thousand/uL      RBC 4 52 Million/uL      Hemoglobin 13 6 g/dL      Hematocrit 41 2 %      MCV 91 fL      MCH 30 1 pg      MCHC 33 0 g/dL      RDW 12 6 %      MPV 10 4 fL      Platelets 579 Thousands/uL      nRBC 0 /100 WBCs      Neutrophils Relative 63 %      Immat GRANS % 0 %      Lymphocytes Relative 24 %      Monocytes Relative 11 %      Eosinophils Relative 2 %      Basophils Relative 0 %      Neutrophils Absolute 2 76 Thousands/µL      Immature Grans Absolute 0 02 Thousand/uL      Lymphocytes Absolute 1 10 Thousands/µL      Monocytes Absolute 0 51 Thousand/µL      Eosinophils Absolute 0 11 Thousand/µL      Basophils Absolute 0 02 Thousands/µL                  CT abdomen pelvis with contrast   ED Interpretation by Turner Willard MD (02/08 1005)   FINDINGS:     ABDOMEN     LOWER CHEST:  No clinically significant abnormality identified in the visualized lower chest      LIVER/BILIARY TREE:  Unremarkable      GALLBLADDER:  No calcified gallstones   No pericholecystic inflammatory change      SPLEEN: Unremarkable      PANCREAS:  Unremarkable      ADRENAL GLANDS:  Unremarkable      KIDNEYS/URETERS:  Unremarkable  No hydronephrosis      STOMACH AND BOWEL:  Unremarkable      APPENDIX:  No findings to suggest appendicitis      ABDOMINOPELVIC CAVITY:  No ascites  No pneumoperitoneum  No lymphadenopathy      VESSELS:  Unremarkable for patient's age      PELVIS     REPRODUCTIVE ORGANS:  Unremarkable for patient's age      URINARY BLADDER:  Unremarkable      ABDOMINAL WALL/INGUINAL REGIONS:  Unremarkable      OSSEOUS STRUCTURES:  No acute fracture or destructive osseous lesion      IMPRESSION:     No acute intra-abdominal abnormality            Workstation performed: [id number]      Final Result by Diogo Guerrero MD (02/08 0914)      No acute intra-abdominal abnormality  Workstation performed: [id number]         XR chest 1 view portable   ED Interpretation by Everton Flanagan MD (02/08 1344)   No acute intrathoracic pathology appreciated  Final Result by Kelsy Yousif MD (02/08 1017)      No acute cardiopulmonary disease                    Workstation performed: [id number]               Procedures  ECG 12 Lead Documentation Only    Date/Time: 2/8/2022 12:32 PM  Performed by: Everton Flanagan MD  Authorized by: Everton Flanagan MD     Patient location:  ED  Previous ECG:     Previous ECG:  Compared to current    Similarity:  No change    Comparison to cardiac monitor: No    Interpretation:     Interpretation: abnormal    Rate:     ECG rate:  66    ECG rate assessment: normal    Rhythm:     Rhythm: sinus rhythm    Ectopy:     Ectopy: none    QRS:     QRS axis:  Right    QRS intervals:  Normal  Conduction:     Conduction: normal    ST segments:     ST segments:  Normal  T waves:     T waves: normal            ED Course                             SBIRT 20yo+      Most Recent Value   SBIRT (22 yo +)    In order to provide better care to our patients, we are screening all of our patients for alcohol and drug use  Would it be okay to ask you these screening questions? Unable to answer at this time Filed at: 02/08/2022 9682                [facility]  Number of Diagnoses or Management Options  Acute gastritis without hemorrhage, unspecified gastritis type: new and requires workup  Atypical chest pain: new and requires workup  Diagnosis management comments: Jonas Bowser comes to the ED with 3 days of persistent nausea/abdominal discomfort with sudden onset of left-sided chest pain this morning  Based on initial presentation to the emergency department, initial laboratory studies were sent:  CBC was unremarkable  CMP was unremarkable  Lipase was unremarkable  Troponin was under two  Urinalysis was bland in urine pregnancy test was negative  ECG was unremarkable  Normal sinus rhythm with right axis deviation no signs of ischemic changes  Chest x-ray was unremarkable for intrathoracic pathology  CT of the abdomen was ordered secondary to tenderness on palpation of abdominal examination  CT of the abdomen and pelvis showed no acute intra-abdominal pathology  Based on benign workup here in the emergency department, patient was deemed stable enough for discharge home  Patient was also provided with additional dosages of viscous lidocaine, Maalox, and Pepcid  Patient experienced mild improvement in symptoms but patient expressed that her discomfort was still noticeable  Patient was counseled to follow-up with her primary care provider, was provided with contact information to follow up with both Cardiology and GI, and was told to continue utilizing over-the-counter medications as needed for her nausea/dyspepsia  Patient expressed understanding with this plan and would reach out to the provided numbers as soon as possible for continued management of her symptoms    Strict return precautions that would warrant continued evaluation in the emergency department were discussed at the bedside  Patient expressed understanding  Disposition: Discharged home with over-the-counter medications for symptom management  Close PCP, Cardiology, GI follow-up  Strict return precautions discussed  Amount and/or Complexity of Data Reviewed  Clinical lab tests: ordered and reviewed  Tests in the radiology section of CPT®: ordered and reviewed  Review and summarize past medical records: yes  Discuss the patient with other providers: yes  Independent visualization of images, tracings, or specimens: yes    Risk of Complications, Morbidity, and/or Mortality  Presenting problems: moderate  Diagnostic procedures: moderate  Management options: moderate    Patient Progress  Patient progress: stable      Chart above is residents charted and was signed by me  Agree with resident chart as above  32 y/o with chest pain, left arm pain and pain going to the neck  [place]  Improving while in ED  Agree with cardiac and abdominal work up  Ct  Will need repeat trop    Disposition  Final diagnoses:   Atypical chest pain   Acute gastritis without hemorrhage, unspecified gastritis type     Time reflects when diagnosis was documented in both MDM as applicable and the Disposition within this note     Time User Action Codes Description Comment    2/8/2022 10:12 AM Jodie Delaney Add [R07 89] Atypical chest pain     2/8/2022 10:12 AM Jodie Delaney Add [K29 00] Acute gastritis without hemorrhage, unspecified gastritis type       ED Disposition     ED Disposition Condition Date/Time Comment    Discharge Stable Tue Feb 8, 2022 10:12 AM Wilder Mcfarland discharge to home/self care              Follow-up Information     Follow up With Specialties Details Why Contact Info Additional [facility] Schedule an appointment as soon as possible for a visit  As needed, If symptoms worsen [phone]  [phone] Tr[facility], [address] [address] [facility], [place], [place], [address]    [facility] Schedule an appointment as soon as possible for a visit  As needed, If symptoms worsen [phone] Cardiology [facility], [place], [place], [address] Emergency Department Emergency Medicine  As needed, If symptoms worsen [address] [facility] [address] Emergency Department, [address], [place], [place], [address]          Discharge Medication List as of 2/8/2022 10:31 AM      CONTINUE these medications which have NOT CHANGED    Details   meclizine (ANTIVERT) 25 mg tablet Take 1 tablet (25 mg total) by mouth 3 (three) times a day as needed for dizziness, Starting Sun 4/8/2018, Print           No discharge procedures on file  PDMP Review     None           ED Provider  Attending physically available and evaluated Rohith Estrella  VINNIE managed the patient along with the ED Attending      Electronically Signed by         Abdulaziz Gandara MD  02/08/22 1239       Raoul Suarez MD  02/09/22 1816       Raoul Suarez MD  02/09/22 3698

## 2022-02-08 NOTE — ED NOTES
Patient resting with lights off, call bell within reach, side rail upx1     Venkata Lemus RN  02/08/22 8497

## 2022-02-08 NOTE — DISCHARGE INSTRUCTIONS
Please follow-up with your primary care provider as soon as possible for continued evaluation of your symptoms  You have also been provided with follow-up information to follow-up with a cardiologist and a gastroenterologist for continued evaluation of your symptoms  Please continue to use over-the-counter medications for your symptoms (Pepcid and Mylanta) for management of your abdominal discomfort  Please return to the emergency department if you experience worsening of symptoms that include but are not limited to: Loss of consciousness, dizziness, changes in vision, changes in hearing, chest pain that has not resolve on its own, increased sweating, nausea, vomiting, increased difficulty catching her breath, fevers, or chills

## 2022-02-09 NOTE — ED ATTENDING ATTESTATION
2/8/2022  I, Cristian Chester MD, saw and evaluated the patient  I have discussed the patient with the resident/non-physician practitioner and agree with the resident's/non-physician practitioner's findings, Plan of Care, and MDM as documented in the resident's/non-physician practitioner's note, except where noted  All available labs and Radiology studies were reviewed  I was present for key portions of any procedure(s) performed by the resident/non-physician practitioner and I was immediately available to provide assistance  At this point I agree with the current assessment done in the Emergency Department    I have conducted an independent evaluation of this patient a history and physical is as follows:    Please see my attestation note at the bottom of above chart    ED Course         Critical Care Time  Procedures

## 2023-07-17 ENCOUNTER — APPOINTMENT (EMERGENCY)
Dept: RADIOLOGY | Facility: HOSPITAL | Age: 33
End: 2023-07-17
Payer: COMMERCIAL

## 2023-07-17 ENCOUNTER — APPOINTMENT (EMERGENCY)
Dept: CT IMAGING | Facility: HOSPITAL | Age: 33
End: 2023-07-17
Payer: COMMERCIAL

## 2023-07-17 ENCOUNTER — HOSPITAL ENCOUNTER (EMERGENCY)
Facility: HOSPITAL | Age: 33
Discharge: HOME | End: 2023-07-17
Attending: EMERGENCY MEDICINE
Payer: COMMERCIAL

## 2023-07-17 VITALS
HEIGHT: 67 IN | OXYGEN SATURATION: 98 % | DIASTOLIC BLOOD PRESSURE: 69 MMHG | SYSTOLIC BLOOD PRESSURE: 116 MMHG | BODY MASS INDEX: 29.62 KG/M2 | WEIGHT: 188.71 LBS | TEMPERATURE: 98.9 F | HEART RATE: 78 BPM | RESPIRATION RATE: 19 BRPM

## 2023-07-17 DIAGNOSIS — S16.1XXA STRAIN OF NECK MUSCLE, INITIAL ENCOUNTER: ICD-10-CM

## 2023-07-17 DIAGNOSIS — S60.811A ABRASION OF RIGHT WRIST, INITIAL ENCOUNTER: ICD-10-CM

## 2023-07-17 DIAGNOSIS — R07.89 CHEST WALL PAIN: ICD-10-CM
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
## 2023-07-17 LAB
EXT PREGNANCY TEST URINE: NEGATIVE
EXT. CONTROL: NORMAL

## 2023-07-17 PROCEDURE — 81025 URINE PREGNANCY TEST: CPT | Performed by: PHYSICIAN ASSISTANT

## 2023-07-17 PROCEDURE — 73110 X-RAY EXAM OF WRIST: CPT

## 2023-07-17 PROCEDURE — 71045 X-RAY EXAM CHEST 1 VIEW: CPT

## 2023-07-17 PROCEDURE — 72125 CT NECK SPINE W/O DYE: CPT

## 2023-07-17 PROCEDURE — 99285 EMERGENCY DEPT VISIT HI MDM: CPT | Performed by: PHYSICIAN ASSISTANT

## 2023-07-17 PROCEDURE — 70450 CT HEAD/BRAIN W/O DYE: CPT

## 2023-07-17 PROCEDURE — 99284 EMERGENCY DEPT VISIT MOD MDM: CPT

## 2023-07-17 PROCEDURE — 93005 ELECTROCARDIOGRAM TRACING: CPT

## 2023-07-17 RX ORDER — LIDOCAINE 50 MG/G
1 PATCH TOPICAL DAILY
Qty: 15 PATCH | Refills: 0 | Status: SHIPPED | OUTPATIENT
Start: 2023-07-17

## 2023-07-17 RX ORDER — METHOCARBAMOL 500 MG/1
500 TABLET, FILM COATED ORAL 2 TIMES DAILY
Qty: 10 TABLET | Refills: 0 | Status: SHIPPED | OUTPATIENT
Start: 2023-07-17
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
## 2023-07-17 NOTE — Clinical Note
Jolynn Naz was seen and treated in our emergency department on 7/17/2023.    ?    ? ? Diagnosis: ?    Chiquita Blanco  may return to work on return date. She may return on this date: 07/19/2023    ? If you have any questions or concerns, please don't hesitate to call.       Sb Rocha PA-C    ______________________________           _______________          _______________  Hospital Representative                              Date                                Time

## 2023-07-20 LAB
ATRIAL RATE: 70 BPM
P AXIS: 41 DEGREES
PR INTERVAL: 132 MS
QRS AXIS: 70 DEGREES
QRSD INTERVAL: 82 MS
QT INTERVAL: 382 MS
QTC INTERVAL: 412 MS
T WAVE AXIS: 43 DEGREES
VENTRICULAR RATE: 70 BPM

## 2023-07-20 PROCEDURE — 93010 ELECTROCARDIOGRAM REPORT: CPT | Performed by: INTERNAL MEDICINE
